# Patient Record
Sex: FEMALE | URBAN - METROPOLITAN AREA
[De-identification: names, ages, dates, MRNs, and addresses within clinical notes are randomized per-mention and may not be internally consistent; named-entity substitution may affect disease eponyms.]

---

## 2022-01-05 ENCOUNTER — OUTSIDE PLACE OF SERVICE (OUTPATIENT)
Dept: GASTROENTEROLOGY | Facility: CLINIC | Age: 52
End: 2022-01-05
Payer: COMMERCIAL

## 2022-01-05 LAB — CRC RECOMMENDATION EXT: NORMAL

## 2022-01-05 PROCEDURE — 45385 PR COLONOSCOPY,REMV LESN,SNARE: ICD-10-PCS | Mod: 33,,, | Performed by: INTERNAL MEDICINE

## 2022-01-05 PROCEDURE — 45385 COLONOSCOPY W/LESION REMOVAL: CPT | Mod: 33,,, | Performed by: INTERNAL MEDICINE

## 2022-10-20 ENCOUNTER — DOCUMENTATION ONLY (OUTPATIENT)
Dept: GASTROENTEROLOGY | Facility: CLINIC | Age: 52
End: 2022-10-20
Payer: COMMERCIAL

## 2025-04-04 ENCOUNTER — TELEPHONE (OUTPATIENT)
Dept: GASTROENTEROLOGY | Facility: CLINIC | Age: 55
End: 2025-04-04
Payer: COMMERCIAL

## 2025-04-04 DIAGNOSIS — Z86.0100 HISTORY OF COLON POLYPS: Primary | ICD-10-CM

## 2025-04-04 RX ORDER — METHOTREXATE 2.5 MG/1
TABLET ORAL
COMMUNITY
Start: 2024-11-26

## 2025-04-04 RX ORDER — TOPIRAMATE 100 MG/1
1 TABLET, FILM COATED ORAL NIGHTLY
COMMUNITY
Start: 2024-11-12

## 2025-04-04 RX ORDER — FOLIC ACID 1 MG/1
1000 TABLET ORAL
COMMUNITY

## 2025-04-04 RX ORDER — HYDROXYCHLOROQUINE SULFATE 200 MG/1
300 TABLET, FILM COATED ORAL
COMMUNITY
Start: 2024-12-17

## 2025-04-04 RX ORDER — ATORVASTATIN CALCIUM 40 MG/1
1 TABLET, FILM COATED ORAL NIGHTLY
COMMUNITY
Start: 2024-11-22

## 2025-04-04 RX ORDER — RIZATRIPTAN BENZOATE 10 MG/1
10 TABLET ORAL
COMMUNITY

## 2025-04-04 RX ORDER — FEZOLINETANT 45 MG/1
1 TABLET, FILM COATED ORAL DAILY
COMMUNITY
Start: 2024-11-18

## 2025-04-04 NOTE — TELEPHONE ENCOUNTER
----- Message from Blanca sent at 4/4/2025 11:29 AM CDT -----  Contact: self  Type:  Patient Returning CallWho Called:Fide Avila Left Message for Patient:unsureDoes the patient know what this is regarding?:colonoscopy recheckWould the patient rather a call back or a [facility]? [facility] Call Back Number:[phone] Information: n/a

## 2025-04-04 NOTE — TELEPHONE ENCOUNTER
"Chart reviewed and updated with patient. Patient denied hx of seizures, kidney disease, or sleep apnea. Patient takes high cholesterol medications. Extended prep is not needed. Prep instructions also reviewed and sent to patient's email address at [email or web address] . Colonoscopy scheduled on Monday, September 8, 2025 with [facility] at [facility].         5'6" 138#      "

## 2025-04-09 PROBLEM — N18.9 CHRONIC KIDNEY DISEASE, UNSPECIFIED: Status: ACTIVE | Noted: 2024-11-22

## 2025-04-09 PROBLEM — M35.00 SJOGREN'S SYNDROME: Status: ACTIVE | Noted: 2024-12-23

## 2025-04-09 PROBLEM — L63.8: Status: ACTIVE | Noted: 2024-11-22

## 2025-04-09 PROBLEM — E87.8 HYPERCHLOREMIA: Status: ACTIVE | Noted: 2024-12-23

## 2025-04-09 PROBLEM — N95.1 HOT FLASHES DUE TO MENOPAUSE: Status: ACTIVE | Noted: 2024-12-23

## 2025-04-09 PROBLEM — I70.0 ATHEROSCLEROSIS OF AORTA: Status: ACTIVE | Noted: 2024-11-22

## 2025-04-09 PROBLEM — E78.2 MIXED HYPERLIPIDEMIA: Status: ACTIVE | Noted: 2024-11-22

## 2025-04-09 PROBLEM — G43.009 MIGRAINE WITHOUT AURA AND WITHOUT STATUS MIGRAINOSUS, NOT INTRACTABLE: Status: ACTIVE | Noted: 2024-11-22

## 2025-04-10 ENCOUNTER — OFFICE VISIT (OUTPATIENT)
Dept: OBSTETRICS AND GYNECOLOGY | Facility: CLINIC | Age: 55
End: 2025-04-10
Payer: COMMERCIAL

## 2025-04-10 VITALS — SYSTOLIC BLOOD PRESSURE: 110 MMHG | DIASTOLIC BLOOD PRESSURE: 65 MMHG | WEIGHT: 149.81 LBS

## 2025-04-10 DIAGNOSIS — Z12.31 BREAST CANCER SCREENING BY MAMMOGRAM: ICD-10-CM

## 2025-04-10 DIAGNOSIS — N94.19 DYSPAREUNIA DUE TO MEDICAL CONDITION IN FEMALE: ICD-10-CM

## 2025-04-10 DIAGNOSIS — R68.82 DECREASED LIBIDO: ICD-10-CM

## 2025-04-10 DIAGNOSIS — Z01.419 ENCOUNTER FOR WELL WOMAN EXAM WITH ROUTINE GYNECOLOGICAL EXAM: Primary | ICD-10-CM

## 2025-04-10 DIAGNOSIS — N95.2 ATROPHIC VAGINITIS: ICD-10-CM

## 2025-04-10 RX ORDER — ESTRADIOL 0.1 MG/G
1 CREAM VAGINAL
Qty: 42.5 G | Refills: 0 | Status: SHIPPED | OUTPATIENT
Start: 2025-04-11 | End: 2025-05-11

## 2025-04-10 NOTE — PROGRESS NOTES
CC:  WELL WOMAN (menopausal since )  Patient Care Team:  Arden Israel MD as PCP - General (Family Medicine)  Mp Hunter MD (Rheumatology)  Oriana Wade MD as Consulting Physician (Gastroenterology)    NEW PATIENT    HPI:  Patient is a 54 y.o. who presents for her well woman exam today.  History reviewed with patient.   Patient also has additional concerns she wants to discuss at this visit (see additional problem note below)     HRT- none  HX ABNL PAPS: in past-no excisional tx    REVIEW OF PRIOR DATA/ HEALTH MAINTENANCE:  LAST ANNUAL:    5+yrs     LAST MMG- 2024-  [facility]     LAST COLONOSCOPY- - by Dr. Wade - q 3 yrs for polyps- sched for 2025        Past Medical History:   Diagnosis Date    BMI 22.3     Migraines     Mixed hyperlipidemia     Sjogren syndrome, unspecified     Vasomotor symptoms due to menopause      SURGICAL HX:   has a past surgical history that includes Knee surgery (Left, ); Colonoscopy (N/A, 2022); Repair of meniscus of knee; Breast biopsy (Left, ); and Cervix lesion destruction ().    SOCIAL HX:    reports that she has never smoked. She has never used smokeless tobacco. She reports current alcohol use. She reports that she does not use drugs.    Current Outpatient Medications   Medication Instructions    atorvastatin (LIPITOR) 40 MG tablet 1 tablet, Nightly    [START ON 2025] estradioL (ESTRACE) 1 g, Vaginal, Three times weekly    folic acid (FOLVITE) 1,000 mcg    hydroxychloroquine (PLAQUENIL) 300 mg    methotrexate 2.5 MG Tab TAKE 8 TABLETS BY MOUTH ONCE A WEEK HOLD  FOR  INFECTIONS    rizatriptan (MAXALT) 10 mg, As needed (PRN)    topiramate (TOPAMAX) 100 MG tablet 1 tablet, Nightly    VEOZAH 45 mg Tab 1 tablet, Daily     VITALS:  Blood pressure 110/65, weight 67.9 kg (149 lb 12.8 oz).  There is no height or weight on file to calculate BMI.     PHYSICAL EXAM-  APPEARANCE: Well appearing, in no acute distress.   CV/PULM: No resp  distress, normal resp effort   PSYCH:  Normal mood and affect, cooperative   SKIN: No rashes, lesions, or abnormal bruising   ABD: Soft, without tenderness or masses.   BREAST: deferred/declined  PELVIC:  VULVA: Normal female genitalia. No lesions.   URETHRAL MEATUS: No masses, no significant prolapse.   BLADDER/ URETHRA: No masses or suprapubic tenderness   VAGINA/ CVX: No lesions. +atrophic changes. No discharge   UTERUS:  mobile, non-tender   ADNEXA: No masses, tenderness, or fullness   ANUS/ PERINEUM: Normal tone.  No lesions.           *patient verbally consented for exam and female chaperone present for entire exam     ASSESSMENT and PLAN:  Encounter for well woman exam with routine gynecological exam  -     Liquid-based pap smear, screening    Breast cancer screening by mammogram  -     Mammo Digital Screening Bilat w/ Goyo (XPD); Future; Expected date: 04/24/2025      FOLLOWUP:  1 year for wwe or sooner prn    COUNSELING:  Patient was counseled today on recommendation for yearly pelvic exam, current Pap guidelines, self breast exams, annual screening mammograms, routine screening colonoscopy , and bone density testing.  Discussed vitamin D and calcium supplements as well as weight bearing exercise to decrease risks. Encouraged patient to see her PCP for other health maintenance.    PROBLEM VISIT:  Problem HPI/ROS:            Pt has been having increasing problems with decreased libido. Describes intercourse as almost impossible due to pain and dryness. Was diagnosed with sjogrens and has dry eyes, mouth and vagina as well. Also menopausal since 2022 and not on hrt as she has 2 family members with breast cancer and wants to avoid systemic hrt. Uses veozah for her hot flashes and that works very well for her.  Discussed hormonal and non hormonal options for vag dryness and r/b. Pt wants to start with estrogen vaginal cream initially and may switch to intrarosa for maintenance in the future.Also will use  Revaree on the other days for moisturizing.  Discussed the multiple causes for decreased libido and if still present once dryness and pain resolved will evaluate that further.        Problem PHYS EXAM:  (pertinent findings noted in PHYS EXAM above)     Problem ASSESMENT and PLAN:    Decreased libido    Atrophic vaginitis  -     estradioL (ESTRACE) 0.01 % (0.1 mg/gram) vaginal cream; Place 1 g vaginally 3 (three) times a week.  Dispense: 42.5 g; Refill: 0    Dyspareunia due to medical condition in female      *Total time spent: 40 min. 50 % or more was spent on counseling about diagnosis, treatment options, and coordination of care.

## 2025-04-16 ENCOUNTER — RESULTS FOLLOW-UP (OUTPATIENT)
Dept: OBSTETRICS AND GYNECOLOGY | Facility: CLINIC | Age: 55
End: 2025-04-16

## 2025-04-16 ENCOUNTER — TELEPHONE (OUTPATIENT)
Dept: OBSTETRICS AND GYNECOLOGY | Facility: CLINIC | Age: 55
End: 2025-04-16
Payer: COMMERCIAL

## 2025-04-16 NOTE — PROGRESS NOTES
Please let patient know her pap and hpv are both negative    Please encourage her to set up the [employer] annita if she has not done so

## 2025-04-16 NOTE — TELEPHONE ENCOUNTER
----- Message from Ada Denson MD sent at 4/16/2025  4:44 PM CDT -----  Please let patient know her pap and hpv are both negative    Please encourage her to set up the [employer] annita if she has not done so  ----- Message -----  From: Tati, Lab [facility]  Sent: 4/16/2025   9:25 AM CDT  To: Ada Denson MD

## 2025-04-21 RX ORDER — SOD SULF/POT CHLORIDE/MAG SULF 1.479 G
TABLET ORAL
Qty: 24 TABLET | Refills: 0 | Status: SHIPPED | OUTPATIENT
Start: 2025-04-21

## 2025-07-05 DIAGNOSIS — N95.2 ATROPHIC VAGINITIS: ICD-10-CM

## 2025-07-13 RX ORDER — ESTRADIOL 0.1 MG/G
CREAM VAGINAL
Qty: 43 G | Refills: 0 | Status: SHIPPED | OUTPATIENT
Start: 2025-07-13

## 2025-08-29 ENCOUNTER — TELEPHONE (OUTPATIENT)
Dept: GASTROENTEROLOGY | Facility: CLINIC | Age: 55
End: 2025-08-29
Payer: COMMERCIAL

## 2025-08-29 DIAGNOSIS — Z86.0100 HISTORY OF COLON POLYPS: Primary | ICD-10-CM
